# Patient Record
Sex: FEMALE | Race: WHITE | URBAN - METROPOLITAN AREA
[De-identification: names, ages, dates, MRNs, and addresses within clinical notes are randomized per-mention and may not be internally consistent; named-entity substitution may affect disease eponyms.]

---

## 2022-01-25 ENCOUNTER — OFFICE VISIT (OUTPATIENT)
Dept: URBAN - METROPOLITAN AREA CLINIC 71 | Facility: CLINIC | Age: 77
End: 2022-01-25
Payer: MEDICARE

## 2022-01-25 DIAGNOSIS — H40.013 OPEN ANGLE WITH BORDERLINE FINDINGS, LOW RISK, BILATERAL: ICD-10-CM

## 2022-01-25 DIAGNOSIS — Z96.1 PRESENCE OF INTRAOCULAR LENS: ICD-10-CM

## 2022-01-25 DIAGNOSIS — H47.213 PRIMARY OPTIC ATROPHY, BILATERAL: ICD-10-CM

## 2022-01-25 DIAGNOSIS — H35.3121 NEXDTVE AGE-RELATED MCLR DEGN, LEFT EYE, EARLY DRY STAGE: Primary | ICD-10-CM

## 2022-01-25 DIAGNOSIS — H53.002 AMBLYOPIA OF LT EYE: ICD-10-CM

## 2022-01-25 PROCEDURE — 99203 OFFICE O/P NEW LOW 30 MIN: CPT | Performed by: OPHTHALMOLOGY

## 2022-01-25 PROCEDURE — 92133 CPTRZD OPH DX IMG PST SGM ON: CPT | Performed by: OPHTHALMOLOGY

## 2022-01-25 ASSESSMENT — INTRAOCULAR PRESSURE
OD: 15
OS: 15
OD: 12
OS: 12

## 2022-01-25 ASSESSMENT — KERATOMETRY
OS: 44.63
OD: 44.13

## 2022-01-25 NOTE — IMPRESSION/PLAN
Impression: Open angle with borderline findings, low risk, bilateral: H40.013. OCT ordered and reviewed, today. Reviewed previous scans from [facility]. RNFL is stable. IOP is within a normal range. Plan: Patient is okay to continue without the use of drops. Return in 6 months for VF testing and OPTOS imaging.

## 2022-01-25 NOTE — IMPRESSION/PLAN
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 No leakage seen today. Stable. 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 The diagnosis, natural history, and prognosis of dry AMD were discussed. The patient was instructed to begin taking AREDS protocol antioxidant vitamins. The use of an Amsler grid and the importance of weekly self-monitoring were reviewed. Call if any changes in vision occur.

## 2022-07-26 ENCOUNTER — OFFICE VISIT (OUTPATIENT)
Dept: URBAN - METROPOLITAN AREA CLINIC 71 | Facility: CLINIC | Age: 77
End: 2022-07-26
Payer: MEDICARE

## 2022-07-26 DIAGNOSIS — H40.013 OPEN ANGLE WITH BORDERLINE FINDINGS, LOW RISK, BILATERAL: Primary | ICD-10-CM

## 2022-07-26 DIAGNOSIS — Z96.1 PRESENCE OF INTRAOCULAR LENS: ICD-10-CM

## 2022-07-26 DIAGNOSIS — H35.3121 NEXDTVE AGE-RELATED MCLR DEGN, LEFT EYE, EARLY DRY STAGE: ICD-10-CM

## 2022-07-26 DIAGNOSIS — H53.002 AMBLYOPIA OF LT EYE: ICD-10-CM

## 2022-07-26 PROCEDURE — 92083 EXTENDED VISUAL FIELD XM: CPT | Performed by: OPHTHALMOLOGY

## 2022-07-26 PROCEDURE — 99213 OFFICE O/P EST LOW 20 MIN: CPT | Performed by: OPHTHALMOLOGY

## 2022-07-26 ASSESSMENT — INTRAOCULAR PRESSURE
OD: 14
OD: 13
OS: 14

## 2022-07-26 NOTE — IMPRESSION/PLAN
Impression: Open angle with borderline findings, low risk, bilateral: H40.013. VF and OPTOS ordered and reviewed today. VF shows an arcuate OU. IOP is within a normal range. Patient a glaucoma suspect due to the cupping of the optic nerves. Plan: Patient is okay to continue without the use of drops. Return in 6 months for dilation and OCT testing.

## 2022-07-26 NOTE — IMPRESSION/PLAN
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 No leakage seen today. Stable. Plan: Call if any changes in vision occur.

## 2023-02-08 ENCOUNTER — OFFICE VISIT (OUTPATIENT)
Dept: URBAN - METROPOLITAN AREA CLINIC 71 | Facility: CLINIC | Age: 78
End: 2023-02-08
Payer: MEDICARE

## 2023-02-08 DIAGNOSIS — H43.821 VITREOMACULAR TRACTION OF RIGHT EYE: ICD-10-CM

## 2023-02-08 DIAGNOSIS — H35.3121 NEXDTVE AGE-RELATED MCLR DEGN, LEFT EYE, EARLY DRY STAGE: ICD-10-CM

## 2023-02-08 DIAGNOSIS — Z96.1 PRESENCE OF INTRAOCULAR LENS: ICD-10-CM

## 2023-02-08 DIAGNOSIS — H40.013 OPEN ANGLE WITH BORDERLINE FINDINGS, LOW RISK, BILATERAL: Primary | ICD-10-CM

## 2023-02-08 DIAGNOSIS — H53.002 AMBLYOPIA OF LT EYE: ICD-10-CM

## 2023-02-08 PROCEDURE — 92133 CPTRZD OPH DX IMG PST SGM ON: CPT | Performed by: OPHTHALMOLOGY

## 2023-02-08 PROCEDURE — 99213 OFFICE O/P EST LOW 20 MIN: CPT | Performed by: OPHTHALMOLOGY

## 2023-02-08 ASSESSMENT — INTRAOCULAR PRESSURE
OD: 16
OS: 9
OS: 16
OD: 12

## 2023-02-08 NOTE — IMPRESSION/PLAN
Impression: Open angle with borderline findings, low risk, bilateral: H40.013. OCT ordered and reviewed today. No evidence of progression. IOP is within a normal range without the use of drops at this time. Plan: Patient is okay to continue without the use of drops. Return in 6 months for VF testing and OPTOS imaging.

## 2023-11-06 ENCOUNTER — OFFICE VISIT (OUTPATIENT)
Dept: URBAN - METROPOLITAN AREA CLINIC 71 | Facility: CLINIC | Age: 78
End: 2023-11-06
Payer: MEDICARE

## 2023-11-06 DIAGNOSIS — H40.013 OPEN ANGLE WITH BORDERLINE FINDINGS, LOW RISK, BILATERAL: ICD-10-CM

## 2023-11-06 DIAGNOSIS — H35.3121 NEXDTVE AGE-RELATED MCLR DEGN, LEFT EYE, EARLY DRY STAGE: Primary | ICD-10-CM

## 2023-11-06 DIAGNOSIS — H40.053 OCULAR HYPERTENSION, BILATERAL: ICD-10-CM

## 2023-11-06 DIAGNOSIS — G35 MULTIPLE SCLEROSIS: ICD-10-CM

## 2023-11-06 DIAGNOSIS — Z96.1 PRESENCE OF INTRAOCULAR LENS: ICD-10-CM

## 2023-11-06 DIAGNOSIS — H53.002 AMBLYOPIA OF LT EYE: ICD-10-CM

## 2023-11-06 DIAGNOSIS — H43.821 VITREOMACULAR TRACTION OF RIGHT EYE: ICD-10-CM

## 2023-11-06 PROCEDURE — 92083 EXTENDED VISUAL FIELD XM: CPT | Performed by: OPHTHALMOLOGY

## 2023-11-06 PROCEDURE — 99213 OFFICE O/P EST LOW 20 MIN: CPT | Performed by: OPHTHALMOLOGY

## 2023-11-06 RX ORDER — LATANOPROST 50 UG/ML
0.005 % SOLUTION OPHTHALMIC
Qty: 2.5 | Refills: 0 | Status: INACTIVE
Start: 2023-11-06 | End: 2023-11-07

## 2023-11-06 ASSESSMENT — INTRAOCULAR PRESSURE
OS: 15
OD: 18
OD: 13
OS: 18

## 2023-12-04 ENCOUNTER — OFFICE VISIT (OUTPATIENT)
Dept: URBAN - METROPOLITAN AREA CLINIC 71 | Facility: CLINIC | Age: 78
End: 2023-12-04
Payer: MEDICARE

## 2023-12-04 DIAGNOSIS — H40.053 OCULAR HYPERTENSION, BILATERAL: Primary | ICD-10-CM

## 2023-12-04 DIAGNOSIS — Z96.1 PRESENCE OF INTRAOCULAR LENS: ICD-10-CM

## 2023-12-04 PROCEDURE — 99213 OFFICE O/P EST LOW 20 MIN: CPT | Performed by: OPHTHALMOLOGY

## 2023-12-04 RX ORDER — DORZOLAMIDE HYDROCHLORIDE AND TIMOLOL MALEATE 20; 5 MG/ML; MG/ML
SOLUTION/ DROPS OPHTHALMIC
Qty: 30 | Refills: 3 | Status: ACTIVE
Start: 2023-12-04

## 2023-12-04 ASSESSMENT — INTRAOCULAR PRESSURE
OD: 15
OS: 18
OD: 18